# Patient Record
Sex: FEMALE | Race: WHITE | NOT HISPANIC OR LATINO | Employment: FULL TIME
[De-identification: names, ages, dates, MRNs, and addresses within clinical notes are randomized per-mention and may not be internally consistent; named-entity substitution may affect disease eponyms.]

---

## 2017-03-15 PROBLEM — M25.642 FINGER STIFFNESS, LEFT: Status: ACTIVE | Noted: 2017-03-15

## 2017-08-24 PROCEDURE — 88175 CYTOPATH C/V AUTO FLUID REDO: CPT | Performed by: INTERNAL MEDICINE

## 2017-09-11 PROBLEM — M72.0 DUPUYTREN'S CONTRACTURE OF BOTH HANDS: Status: ACTIVE | Noted: 2017-09-11

## 2017-09-14 PROCEDURE — 87086 URINE CULTURE/COLONY COUNT: CPT | Performed by: INTERNAL MEDICINE

## 2017-09-18 PROBLEM — M72.0 DUPUYTREN'S CONTRACTURE OF LEFT HAND: Status: ACTIVE | Noted: 2017-09-18

## 2017-09-19 PROBLEM — M79.645 FINGER PAIN, LEFT: Status: ACTIVE | Noted: 2017-09-19

## 2017-12-11 PROBLEM — M72.2 PLANTAR FASCIAL FIBROMATOSIS OF LEFT FOOT: Status: ACTIVE | Noted: 2017-12-11

## 2018-01-30 PROBLEM — M19.042 OSTEOARTHRITIS OF FINGER, LEFT: Status: ACTIVE | Noted: 2018-01-30

## 2018-02-13 PROBLEM — K76.0 FATTY LIVER: Status: ACTIVE | Noted: 2018-02-13

## 2018-03-28 PROCEDURE — 87493 C DIFF AMPLIFIED PROBE: CPT | Performed by: EMERGENCY MEDICINE

## 2018-03-28 PROCEDURE — 87329 GIARDIA AG IA: CPT | Performed by: EMERGENCY MEDICINE

## 2018-03-28 PROCEDURE — 87272 CRYPTOSPORIDIUM AG IF: CPT | Performed by: EMERGENCY MEDICINE

## 2018-03-28 PROCEDURE — 87046 STOOL CULTR AEROBIC BACT EA: CPT | Performed by: EMERGENCY MEDICINE

## 2018-03-28 PROCEDURE — 87177 OVA AND PARASITES SMEARS: CPT | Performed by: EMERGENCY MEDICINE

## 2018-03-28 PROCEDURE — 87045 FECES CULTURE AEROBIC BACT: CPT | Performed by: EMERGENCY MEDICINE

## 2018-03-28 PROCEDURE — 87209 SMEAR COMPLEX STAIN: CPT | Performed by: EMERGENCY MEDICINE

## 2018-04-05 PROCEDURE — 88305 TISSUE EXAM BY PATHOLOGIST: CPT | Performed by: INTERNAL MEDICINE

## 2018-06-12 PROBLEM — S92.414A: Status: ACTIVE | Noted: 2018-06-12

## 2018-08-28 PROCEDURE — 82164 ANGIOTENSIN I ENZYME TEST: CPT | Performed by: OTHER

## 2018-08-28 PROCEDURE — 83516 IMMUNOASSAY NONANTIBODY: CPT | Performed by: OTHER

## 2018-08-28 PROCEDURE — 86235 NUCLEAR ANTIGEN ANTIBODY: CPT | Performed by: OTHER

## 2018-08-28 PROCEDURE — 86255 FLUORESCENT ANTIBODY SCREEN: CPT | Performed by: OTHER

## 2018-08-28 PROCEDURE — 83519 RIA NONANTIBODY: CPT | Performed by: OTHER

## 2018-08-28 PROCEDURE — 86225 DNA ANTIBODY NATIVE: CPT | Performed by: OTHER

## 2018-08-28 PROCEDURE — 84238 ASSAY NONENDOCRINE RECEPTOR: CPT | Performed by: OTHER

## 2018-08-28 PROCEDURE — 86618 LYME DISEASE ANTIBODY: CPT | Performed by: OTHER

## 2018-09-06 PROBLEM — H02.402 PTOSIS, LEFT: Status: ACTIVE | Noted: 2018-09-06

## 2018-11-02 PROBLEM — K52.9 COLITIS: Status: ACTIVE | Noted: 2018-03-23

## 2018-11-02 PROBLEM — K75.81 NASH (NONALCOHOLIC STEATOHEPATITIS): Status: ACTIVE | Noted: 2018-02-13

## 2019-01-23 PROCEDURE — 83516 IMMUNOASSAY NONANTIBODY: CPT | Performed by: INTERNAL MEDICINE

## 2019-01-23 PROCEDURE — 86038 ANTINUCLEAR ANTIBODIES: CPT | Performed by: INTERNAL MEDICINE

## 2019-01-23 PROCEDURE — 36415 COLL VENOUS BLD VENIPUNCTURE: CPT | Performed by: INTERNAL MEDICINE

## 2019-01-23 PROCEDURE — 86235 NUCLEAR ANTIGEN ANTIBODY: CPT | Performed by: INTERNAL MEDICINE

## 2019-01-23 PROCEDURE — 86200 CCP ANTIBODY: CPT | Performed by: INTERNAL MEDICINE

## 2019-01-23 PROCEDURE — 86431 RHEUMATOID FACTOR QUANT: CPT | Performed by: INTERNAL MEDICINE

## 2019-01-23 PROCEDURE — 86225 DNA ANTIBODY NATIVE: CPT | Performed by: INTERNAL MEDICINE

## 2019-03-12 PROBLEM — M25.859 FEMOROACETABULAR IMPINGEMENT: Status: ACTIVE | Noted: 2019-03-12

## 2019-04-11 PROBLEM — M48.061 LUMBAR FORAMINAL STENOSIS: Status: ACTIVE | Noted: 2019-04-11

## 2019-04-11 PROBLEM — G89.29 CHRONIC BILATERAL LOW BACK PAIN WITH LEFT-SIDED SCIATICA: Status: ACTIVE | Noted: 2019-04-11

## 2019-04-11 PROBLEM — M54.42 CHRONIC BILATERAL LOW BACK PAIN WITH LEFT-SIDED SCIATICA: Status: ACTIVE | Noted: 2019-04-11

## 2019-05-24 PROBLEM — G56.21 ULNAR NEURITIS, RIGHT: Status: ACTIVE | Noted: 2019-05-24

## 2019-06-09 ENCOUNTER — DIAGNOSTIC TRANS (OUTPATIENT)
Dept: OTHER | Age: 54
End: 2019-06-09

## 2019-06-09 ENCOUNTER — HOSPITAL (OUTPATIENT)
Dept: OTHER | Age: 54
End: 2019-06-09

## 2019-06-09 LAB
ALBUMIN SERPL-MCNC: 3.9 G/DL (ref 3.6–5.1)
ALP SERPL-CCNC: 83 UNITS/L (ref 45–117)
ALT SERPL-CCNC: 54 UNITS/L
AMORPH SED URNS QL MICRO: ABNORMAL
ANALYZER ANC (IANC): ABNORMAL
ANION GAP SERPL CALC-SCNC: 13 MMOL/L (ref 10–20)
APPEARANCE UR: CLEAR
APTT PPP: 23 SEC (ref 22–32)
APTT PPP: NORMAL S
APTT PPP: NORMAL S
AST SERPL-CCNC: 41 UNITS/L
BACTERIA #/AREA URNS HPF: ABNORMAL /HPF
BASOPHILS # BLD: 0 K/MCL (ref 0–0.3)
BASOPHILS NFR BLD: 0 %
BILIRUB CONJ SERPL-MCNC: 0.1 MG/DL (ref 0–0.2)
BILIRUB SERPL-MCNC: 0.3 MG/DL (ref 0.2–1)
BILIRUB UR QL: NEGATIVE
BUN SERPL-MCNC: 27 MG/DL (ref 6–20)
BUN/CREAT SERPL: 33 (ref 7–25)
CALCIUM SERPL-MCNC: 9.3 MG/DL (ref 8.4–10.2)
CAOX CRY URNS QL MICRO: ABNORMAL
CHLORIDE SERPL-SCNC: 103 MMOL/L (ref 98–107)
CO2 SERPL-SCNC: 26 MMOL/L (ref 21–32)
COLOR UR: YELLOW
CREAT SERPL-MCNC: 0.82 MG/DL (ref 0.51–0.95)
D DIMER PPP FEU-MCNC: 0.23 MG/L (FEU)
D DIMER PPP FEU-MCNC: NORMAL
DIFFERENTIAL METHOD BLD: ABNORMAL
EOSINOPHIL # BLD: 0 K/MCL (ref 0.1–0.5)
EOSINOPHIL NFR BLD: 0 %
EPITH CASTS #/AREA URNS LPF: ABNORMAL /[LPF]
ERYTHROCYTE [DISTWIDTH] IN BLOOD: 12.2 % (ref 11–15)
FATTY CASTS #/AREA URNS LPF: ABNORMAL /[LPF]
GLUCOSE SERPL-MCNC: 98 MG/DL (ref 65–99)
GLUCOSE UR-MCNC: NEGATIVE MG/DL
GRAN CASTS #/AREA URNS LPF: ABNORMAL /[LPF]
HCT VFR BLD CALC: 42.6 % (ref 36–46.5)
HGB BLD-MCNC: 14.5 G/DL (ref 12–15.5)
HGB UR QL: NEGATIVE
HYALINE CASTS #/AREA URNS LPF: ABNORMAL /LPF (ref 0–5)
IMM GRANULOCYTES # BLD AUTO: 0.1 K/MCL (ref 0–0.2)
IMM GRANULOCYTES NFR BLD: 1 %
INR PPP: 1
INR PPP: NORMAL
KETONES UR-MCNC: ABNORMAL MG/DL
LEUKOCYTE ESTERASE UR QL STRIP: NEGATIVE
LIPASE SERPL-CCNC: 281 UNITS/L (ref 73–393)
LYMPHOCYTES # BLD: 1.5 K/MCL (ref 1–4)
LYMPHOCYTES NFR BLD: 16 %
MCH RBC QN AUTO: 34.9 PG (ref 26–34)
MCHC RBC AUTO-ENTMCNC: 34 G/DL (ref 32–36.5)
MCV RBC AUTO: 102.4 FL (ref 78–100)
MIXED CELL CASTS #/AREA URNS LPF: ABNORMAL /[LPF]
MONOCYTES # BLD: 0.8 K/MCL (ref 0.3–0.9)
MONOCYTES NFR BLD: 9 %
MUCOUS THREADS URNS QL MICRO: ABNORMAL
NEUTROPHILS # BLD: 6.7 K/MCL (ref 1.8–7.7)
NEUTROPHILS NFR BLD: 74 %
NEUTS SEG NFR BLD: ABNORMAL %
NITRITE UR QL: NEGATIVE
NRBC (NRBCRE): 0 /100 WBC
PH UR: 6 UNITS (ref 5–7)
PLATELET # BLD: 220 K/MCL (ref 140–450)
POTASSIUM SERPL-SCNC: 3.9 MMOL/L (ref 3.4–5.1)
PROT SERPL-MCNC: 7.5 G/DL (ref 6.4–8.2)
PROT UR QL: NEGATIVE MG/DL
PROTHROMBIN TIME (PRT2): NORMAL
PROTHROMBIN TIME: 10.2 SEC (ref 9.7–11.8)
RBC # BLD: 4.16 MIL/MCL (ref 4–5.2)
RBC #/AREA URNS HPF: ABNORMAL /HPF (ref 0–2)
RBC CASTS #/AREA URNS LPF: ABNORMAL /[LPF]
RENAL EPI CELLS #/AREA URNS HPF: ABNORMAL /[HPF]
SODIUM SERPL-SCNC: 138 MMOL/L (ref 135–145)
SP GR UR: 1.02 (ref 1–1.03)
SPECIMEN SOURCE: ABNORMAL
SPERM URNS QL MICRO: ABNORMAL
SQUAMOUS #/AREA URNS HPF: ABNORMAL /HPF (ref 0–5)
T VAGINALIS URNS QL MICRO: ABNORMAL
TRI-PHOS CRY URNS QL MICRO: ABNORMAL
TROPONIN I SERPL HS-MCNC: <0.02 NG/ML
URATE CRY URNS QL MICRO: ABNORMAL
URINE REFLEX: ABNORMAL
URNS CMNT MICRO: ABNORMAL
UROBILINOGEN UR QL: 0.2 MG/DL (ref 0–1)
WAXY CASTS #/AREA URNS LPF: ABNORMAL /[LPF]
WBC # BLD: 9.2 K/MCL (ref 4.2–11)
WBC #/AREA URNS HPF: ABNORMAL /HPF (ref 0–5)
WBC CASTS #/AREA URNS LPF: ABNORMAL /[LPF]
YEAST HYPHAE URNS QL MICRO: ABNORMAL
YEAST URNS QL MICRO: ABNORMAL

## 2019-06-09 PROCEDURE — 99245 OFF/OP CONSLTJ NEW/EST HI 55: CPT | Performed by: SURGERY

## 2019-07-30 PROCEDURE — 88305 TISSUE EXAM BY PATHOLOGIST: CPT | Performed by: OTOLARYNGOLOGY

## 2019-08-08 PROBLEM — E78.00 HYPERCHOLESTEROLEMIA: Status: ACTIVE | Noted: 2019-08-08

## 2019-08-08 PROBLEM — F41.9 ANXIETY: Status: ACTIVE | Noted: 2019-08-08

## 2019-08-08 PROBLEM — G47.30 SLEEP APNEA IN ADULT: Status: ACTIVE | Noted: 2019-08-08

## 2019-08-08 PROBLEM — I63.81 CEREBROVASCULAR ACCIDENT (CVA) OF LEFT THALAMUS (HCC): Status: ACTIVE | Noted: 2019-08-08

## 2019-08-08 PROBLEM — I63.9 CEREBROVASCULAR ACCIDENT (CVA) OF LEFT THALAMUS (HCC): Status: ACTIVE | Noted: 2019-08-08

## 2019-08-08 PROBLEM — L92.0 GRANULOMA ANNULARE: Status: ACTIVE | Noted: 2019-08-08

## 2019-08-08 PROBLEM — K83.8 BILIARY SLUDGE: Status: ACTIVE | Noted: 2019-08-08

## 2019-08-08 PROBLEM — K25.3 ANTRAL ULCER, ACUTE: Status: ACTIVE | Noted: 2019-08-08

## 2019-08-08 PROBLEM — R76.8 POSITIVE ANA (ANTINUCLEAR ANTIBODY): Status: ACTIVE | Noted: 2019-08-08

## 2020-02-23 ENCOUNTER — HOSPITAL ENCOUNTER (EMERGENCY)
Age: 55
Discharge: HOME | End: 2020-02-23
Attending: EMERGENCY MEDICINE

## 2020-02-23 VITALS
OXYGEN SATURATION: 96 % | BODY MASS INDEX: 32.81 KG/M2 | TEMPERATURE: 98.2 F | WEIGHT: 167.11 LBS | RESPIRATION RATE: 16 BRPM | HEIGHT: 60 IN | HEART RATE: 94 BPM

## 2020-02-23 DIAGNOSIS — R00.2 PALPITATIONS: Primary | ICD-10-CM

## 2020-02-23 LAB
ALBUMIN SERPL-MCNC: 3.6 G/DL (ref 3.6–5.1)
ALBUMIN/GLOB SERPL: 1 {RATIO} (ref 1–2.4)
ALP SERPL-CCNC: 98 UNITS/L (ref 45–117)
ALT SERPL-CCNC: 45 UNITS/L
ANION GAP SERPL CALC-SCNC: 16 MMOL/L (ref 10–20)
AST SERPL-CCNC: 37 UNITS/L
BASOPHILS # BLD AUTO: 0 K/MCL (ref 0–0.3)
BASOPHILS NFR BLD AUTO: 0 %
BILIRUB SERPL-MCNC: 0.2 MG/DL (ref 0.2–1)
BUN SERPL-MCNC: 17 MG/DL (ref 6–20)
BUN/CREAT SERPL: 25 (ref 7–25)
CALCIUM SERPL-MCNC: 9.6 MG/DL (ref 8.4–10.2)
CHLORIDE SERPL-SCNC: 106 MMOL/L (ref 98–107)
CO2 SERPL-SCNC: 22 MMOL/L (ref 21–32)
CREAT SERPL-MCNC: 0.68 MG/DL (ref 0.51–0.95)
DIFFERENTIAL METHOD BLD: ABNORMAL
EOSINOPHIL # BLD AUTO: 0 K/MCL (ref 0.1–0.5)
EOSINOPHIL NFR SPEC: 0 %
ERYTHROCYTE [DISTWIDTH] IN BLOOD: 12.7 % (ref 11–15)
GLOBULIN SER-MCNC: 3.5 G/DL (ref 2–4)
GLUCOSE SERPL-MCNC: 107 MG/DL (ref 65–99)
HCT VFR BLD CALC: 37.3 % (ref 36–46.5)
HGB BLD-MCNC: 12.2 G/DL (ref 12–15.5)
IMM GRANULOCYTES # BLD AUTO: 0.1 K/MCL (ref 0–0.2)
IMM GRANULOCYTES NFR BLD: 1 %
LYMPHOCYTES # BLD MANUAL: 1.8 K/MCL (ref 1–4)
LYMPHOCYTES NFR BLD MANUAL: 19 %
MAGNESIUM SERPL-MCNC: 2.1 MG/DL (ref 1.7–2.4)
MCH RBC QN AUTO: 33.7 PG (ref 26–34)
MCHC RBC AUTO-ENTMCNC: 32.7 G/DL (ref 32–36.5)
MCV RBC AUTO: 103 FL (ref 78–100)
MONOCYTES # BLD MANUAL: 0.6 K/MCL (ref 0.3–0.9)
MONOCYTES NFR BLD MANUAL: 6 %
NEUTROPHILS # BLD: 7.1 K/MCL (ref 1.8–7.7)
NEUTROPHILS NFR BLD AUTO: 74 %
NRBC BLD MANUAL-RTO: 0 /100 WBC
PLATELET # BLD: 181 K/MCL (ref 140–450)
POTASSIUM SERPL-SCNC: 3.5 MMOL/L (ref 3.4–5.1)
PROT SERPL-MCNC: 7.1 G/DL (ref 6.4–8.2)
RBC # BLD: 3.62 MIL/MCL (ref 4–5.2)
SODIUM SERPL-SCNC: 140 MMOL/L (ref 135–145)
WBC # BLD: 9.6 K/MCL (ref 4.2–11)

## 2020-02-23 PROCEDURE — 80053 COMPREHEN METABOLIC PANEL: CPT

## 2020-02-23 PROCEDURE — 96374 THER/PROPH/DIAG INJ IV PUSH: CPT

## 2020-02-23 PROCEDURE — 85025 COMPLETE CBC W/AUTO DIFF WBC: CPT

## 2020-02-23 PROCEDURE — 83735 ASSAY OF MAGNESIUM: CPT

## 2020-02-23 PROCEDURE — 10002807 HB RX 258: Performed by: EMERGENCY MEDICINE

## 2020-02-23 PROCEDURE — 99283 EMERGENCY DEPT VISIT LOW MDM: CPT

## 2020-02-23 PROCEDURE — 10002800 HB RX 250 W HCPCS: Performed by: EMERGENCY MEDICINE

## 2020-02-23 RX ORDER — ALPRAZOLAM 0.5 MG/1
TABLET ORAL
COMMUNITY
Start: 2019-08-09

## 2020-02-23 RX ORDER — AMITRIPTYLINE HYDROCHLORIDE 10 MG/1
TABLET, FILM COATED ORAL
COMMUNITY
Start: 2020-01-20

## 2020-02-23 RX ORDER — SIMVASTATIN 20 MG
TABLET ORAL
COMMUNITY
Start: 2019-08-08

## 2020-02-23 RX ORDER — CHOLECALCIFEROL (VITAMIN D3) 125 MCG
100 CAPSULE ORAL
COMMUNITY
Start: 2019-08-08

## 2020-02-23 RX ORDER — METOPROLOL SUCCINATE 100 MG/1
TABLET, EXTENDED RELEASE ORAL
COMMUNITY
Start: 2019-08-08

## 2020-02-23 RX ORDER — BUTALBITAL, ACETAMINOPHEN AND CAFFEINE 300; 40; 50 MG/1; MG/1; MG/1
1 CAPSULE ORAL EVERY 6 HOURS PRN
Qty: 6 CAPSULE | Refills: 0 | Status: SHIPPED | OUTPATIENT
Start: 2020-02-23

## 2020-02-23 RX ORDER — METHYLPREDNISOLONE 4 MG/1
TABLET ORAL
COMMUNITY
Start: 2020-02-21 | End: 2020-05-21 | Stop reason: CLARIF

## 2020-02-23 RX ORDER — DULOXETIN HYDROCHLORIDE 30 MG/1
CAPSULE, DELAYED RELEASE ORAL
COMMUNITY
Start: 2018-11-08

## 2020-02-23 RX ORDER — CITALOPRAM 20 MG/1
20 TABLET ORAL
COMMUNITY

## 2020-02-23 RX ADMIN — SODIUM CHLORIDE 1000 ML: 0.9 INJECTION, SOLUTION INTRAVENOUS at 15:14

## 2020-02-23 RX ADMIN — KETOROLAC TROMETHAMINE 15 MG: 30 INJECTION, SOLUTION INTRAMUSCULAR at 15:27

## 2020-02-23 ASSESSMENT — ENCOUNTER SYMPTOMS
SHORTNESS OF BREATH: 0
LIGHT-HEADEDNESS: 0
CHEST TIGHTNESS: 0
CHILLS: 0
FATIGUE: 0
DIZZINESS: 0
VOMITING: 0
EYE PAIN: 0
BACK PAIN: 0
ABDOMINAL PAIN: 0
RHINORRHEA: 0
FEVER: 0
NAUSEA: 0

## 2020-02-23 ASSESSMENT — PAIN SCALES - GENERAL: PAINLEVEL_OUTOF10: 7

## 2020-03-09 ENCOUNTER — OFFICE VISIT (OUTPATIENT)
Dept: RHEUMATOLOGY | Facility: CLINIC | Age: 55
End: 2020-03-09
Payer: COMMERCIAL

## 2020-03-09 ENCOUNTER — APPOINTMENT (OUTPATIENT)
Dept: LAB | Age: 55
End: 2020-03-09
Payer: COMMERCIAL

## 2020-03-09 VITALS
HEART RATE: 94 BPM | BODY MASS INDEX: 32.2 KG/M2 | WEIGHT: 164 LBS | HEIGHT: 60 IN | SYSTOLIC BLOOD PRESSURE: 112 MMHG | DIASTOLIC BLOOD PRESSURE: 70 MMHG

## 2020-03-09 DIAGNOSIS — R60.9 SUBMANDIBULAR GLAND SWELLING: ICD-10-CM

## 2020-03-09 DIAGNOSIS — R60.9 SUBMANDIBULAR GLAND SWELLING: Primary | ICD-10-CM

## 2020-03-09 PROCEDURE — 82787 IGG 1 2 3 OR 4 EACH: CPT

## 2020-03-09 PROCEDURE — 83876 ASSAY MYELOPEROXIDASE: CPT

## 2020-03-09 PROCEDURE — 99244 OFF/OP CNSLTJ NEW/EST MOD 40: CPT | Performed by: INTERNAL MEDICINE

## 2020-03-09 PROCEDURE — 86255 FLUORESCENT ANTIBODY SCREEN: CPT

## 2020-03-09 PROCEDURE — 82164 ANGIOTENSIN I ENZYME TEST: CPT

## 2020-03-09 PROCEDURE — 36415 COLL VENOUS BLD VENIPUNCTURE: CPT

## 2020-03-09 PROCEDURE — 83516 IMMUNOASSAY NONANTIBODY: CPT

## 2020-03-09 NOTE — PROGRESS NOTES
Georgia Francois is a 62year old female who presents for Patient presents with:  Consult  Abnormal Labs  Rash  Swelling  .    HPI:   CC: neck swelling   Consult: referred by PCP Dr. Catherine Parker    This is a 61 yo F with hx of HTN, HLD, CVA April 2019 (R faci needed.  30 tablet 2   • METOPROLOL SUCCINATE  MG Oral Tablet 24 Hr Take 1 tablet by mouth daily 90 tablet 3   • Pantoprazole Sodium 40 MG Oral Tab EC Take 40 mg by mouth every morning before breakfast.     • simvastatin 20 MG Oral Tab Take 1 tablet ( Ulnar neuropathy at elbow, right     EMG 11/16      Past Surgical History:   Procedure Laterality Date   • COLONOSCOPY  2012, 4/18   • EGD  2012    normal dd bx   • IMPLANT LEFT  2004   • IMPLANT RIGHT  2004   • LUMBAR / TRANSFORAMINAL EPIDURAL STEROID INJ hx of anxiety or depression  ENDO: no hx of thyroid disease, no hx of DM  Joint/Muscluskeltal: see HPI,   All other ROS are negative.      EXAM:   LMP  (LMP Unknown)   GEN: AAOx3, NAD  HEENT: EOMI, PERRLA, no injection or icterus, oral mucosa moist,  Neck s Antibody, IgG      0 - 40 AU/mL 11   SSA 60 (Ro) (SAMEER) Ab, IgG      0 - 40 AU/mL 0   SSB (La) (SAMEER) Antibody, IgG      0 - 40 AU/mL 0   Ardon (SAMEER) Antibody, IgG      0 - 40 AU/mL 0   Ribonucleic Protein (U1) (SAMEER) Ab, IgG      0 - 40 AU/mL 0     Component and concentric disc bulging, without lumbar disc extrusion,  specific nerve root compression, or central canal stenosis.   2.  Mild-moderate multilevel lumbar facet arthrosis, contributing to mild-moderate biforaminal  stenosis at L5-S1, without subfacetal

## 2020-03-09 NOTE — PATIENT INSTRUCTIONS
You were seen today for swelling in your neck  Sjogren, IgG 4 related disease is on the differential but your blood work is negative for sjogrens  Lets get some more blood work  In the mean time see endocrinology  May need to do a biopsy of your gland depe

## 2020-03-10 ENCOUNTER — TELEPHONE (OUTPATIENT)
Dept: ENDOCRINOLOGY CLINIC | Facility: CLINIC | Age: 55
End: 2020-03-10

## 2020-03-10 NOTE — TELEPHONE ENCOUNTER
Pt states that her Rheumatologist dr Denice Allison is requesting for the pt to be seen sooner then 6/22/20 for consult, due to lump in neck, which is making it difficult for pt to breath well.

## 2020-03-10 NOTE — TELEPHONE ENCOUNTER
Patient was contacted and offered res 24 spot for this Friday. She is now scheduled as below. Address to [facility] OF THE [facility] provided.     Future Appointments   Date Time Provider Olaf Reilly   3/13/2020 10:30 AM Mckenna Swain MD [address] [place] [address] OF THE [facility]

## 2020-03-11 LAB
ANGIOTENSIN CONVERTING ENZYME: 26 U/L
MYELOPEROX ANTIBODIES, IGG: 0 AU/ML
SERINE PROTEASE3, IGG: 0 AU/ML

## 2020-03-12 LAB
IMMUNOGLOBULIN G SUBCLASS 1: 406 MG/DL
IMMUNOGLOBULIN G SUBCLASS 2: 92 MG/DL
IMMUNOGLOBULIN G SUBCLASS 3: 17 MG/DL
IMMUNOGLOBULIN G SUBCLASS 4: 7 MG/DL

## 2020-03-13 ENCOUNTER — OFFICE VISIT (OUTPATIENT)
Dept: ENDOCRINOLOGY CLINIC | Facility: CLINIC | Age: 55
End: 2020-03-13
Payer: COMMERCIAL

## 2020-03-13 VITALS
DIASTOLIC BLOOD PRESSURE: 70 MMHG | WEIGHT: 169 LBS | SYSTOLIC BLOOD PRESSURE: 113 MMHG | HEART RATE: 93 BPM | BODY MASS INDEX: 33 KG/M2

## 2020-03-13 DIAGNOSIS — E04.1 THYROID NODULE: Primary | ICD-10-CM

## 2020-03-13 DIAGNOSIS — R63.5 WEIGHT GAIN: ICD-10-CM

## 2020-03-13 PROCEDURE — 99243 OFF/OP CNSLTJ NEW/EST LOW 30: CPT | Performed by: INTERNAL MEDICINE

## 2020-03-13 NOTE — PROGRESS NOTES
Name: Nikolay Lauren  Date: 3/13/2020    Referring Physician: No ref. provider found    Patient presents with:  Consult: Pt was Advised by Dr. Marianela Laurent to f/u with Endocrinologist due to swelling on neck and possible cushings.  Pt reports it is difficult for Current Outpatient Medications:   •  ALPRAZolam (XANAX) 0.5 MG Oral Tab, Take 1 tablet (0.5 mg total) by mouth daily as needed. , Disp: 30 tablet, Rfl: 2  •  METOPROLOL SUCCINATE  MG Oral Tablet 24 Hr, Take 1 tablet by mouth daily, Disp: 90 tablet week        Comment: 2-3 per week      Drug use: No    Social History Narrative      . Has a boyfriend who resides in [facility]). 2 children. Works in [employer].       Medical History:   Past Medical History:   Diagnosis Date   • Antr Alert, in no acute distress, well developed  Eyes: normal conjunctivae, sclera.   Ears/Nose/Mouth/Throat/Neck:  normal hearing, normal speech   Neurologic: sensory grossly intact and motor grossly intact  Respiratory:  clear to auscultation bilaterally  Ca

## 2020-03-15 ENCOUNTER — APPOINTMENT (OUTPATIENT)
Dept: LAB | Facility: HOSPITAL | Age: 55
End: 2020-03-15
Attending: INTERNAL MEDICINE
Payer: COMMERCIAL

## 2020-03-15 PROCEDURE — 82533 TOTAL CORTISOL: CPT

## 2020-03-16 ENCOUNTER — APPOINTMENT (OUTPATIENT)
Dept: LAB | Facility: HOSPITAL | Age: 55
End: 2020-03-16
Attending: INTERNAL MEDICINE
Payer: COMMERCIAL

## 2020-03-16 ENCOUNTER — HOSPITAL ENCOUNTER (OUTPATIENT)
Dept: ULTRASOUND IMAGING | Age: 55
Discharge: HOME | End: 2020-03-16
Attending: INTERNAL MEDICINE
Payer: COMMERCIAL

## 2020-03-16 ENCOUNTER — OFFICE VISIT (OUTPATIENT)
Dept: OTOLARYNGOLOGY | Facility: CLINIC | Age: 55
End: 2020-03-16
Payer: COMMERCIAL

## 2020-03-16 VITALS
WEIGHT: 169 LBS | BODY MASS INDEX: 33.18 KG/M2 | SYSTOLIC BLOOD PRESSURE: 110 MMHG | DIASTOLIC BLOOD PRESSURE: 60 MMHG | TEMPERATURE: 98 F | HEIGHT: 60 IN

## 2020-03-16 DIAGNOSIS — R63.5 WEIGHT GAIN: ICD-10-CM

## 2020-03-16 DIAGNOSIS — R22.1 NECK SWELLING: Primary | ICD-10-CM

## 2020-03-16 DIAGNOSIS — E04.1 THYROID NODULE: ICD-10-CM

## 2020-03-16 LAB — CORTIS SERPL-MCNC: 9 UG/DL

## 2020-03-16 PROCEDURE — 82533 TOTAL CORTISOL: CPT

## 2020-03-16 PROCEDURE — 82024 ASSAY OF ACTH: CPT

## 2020-03-16 PROCEDURE — 36415 COLL VENOUS BLD VENIPUNCTURE: CPT

## 2020-03-16 PROCEDURE — 76536 US EXAM OF HEAD AND NECK: CPT | Performed by: INTERNAL MEDICINE

## 2020-03-16 PROCEDURE — 99243 OFF/OP CNSLTJ NEW/EST LOW 30: CPT | Performed by: OTOLARYNGOLOGY

## 2020-03-17 LAB — ADRENOCORTICOTROPIC HORMONE: 10.1 PG/ML

## 2020-03-17 NOTE — PROGRESS NOTES
Georgia Francois is a 62year old female. Patient presents with:  Swelling: pt presents with neck swelling for 1 month, pain when swallowing     HPI:   She has had a lot of pain in her neck and swelling in her neck as well over the last few months.   She i left thalamus ([facility] [address] 8/8/2019   • Cervicalgia 12/1/2015   • Essential hypertension    • Hepatic steatosis 5/27/2014   • History of non anemic vitamin B12 deficiency 4/24/2014   • History of panic attacks    • Hypertension 4/24/2014   • IBS (irritable bowel Normal. Eyebrows - Normal. Skull - Normal.   Oral/Oropharynx Normal Lips - Normal, Tonsils - Normal, Tongue - Normal    Nasal Normal External nose - Normal. Nasal septum - Normal, Turbinates - Normal   Neurological Normal Memory - Normal. Cranial nerves -

## 2020-03-18 LAB — CORTISOL, SALIVA: 0.01 UG/DL

## 2020-03-19 ENCOUNTER — PATIENT MESSAGE (OUTPATIENT)
Dept: ENDOCRINOLOGY CLINIC | Facility: CLINIC | Age: 55
End: 2020-03-19

## 2020-03-19 DIAGNOSIS — E04.1 THYROID NODULE: Primary | ICD-10-CM

## 2020-03-19 RX ORDER — DEXAMETHASONE 1 MG
TABLET ORAL
Qty: 1 TABLET | Refills: 0 | Status: SHIPPED | OUTPATIENT
Start: 2020-03-19 | End: 2020-08-10

## 2020-03-19 NOTE — TELEPHONE ENCOUNTER
Called patient , gave her message by dr Scotty Barragan. Patient verbalized understanding and  agrees with plan. Medication sent to [facility] and labs ordered.

## 2020-03-19 NOTE — TELEPHONE ENCOUNTER
From: Maryellen Merino  To: Gary Doty MD  Sent: 3/19/2020 10:47 AM CDT  Subject: Test Results Question    Hi Dr. Michelle Fernandez,    I just received the results from my saliva cortisol test that I completed at 11:30pm on Sunday evening.  Based on my number lookin

## 2020-03-19 NOTE — TELEPHONE ENCOUNTER
The results are fairly normal so I do think the risk of Cushings at this point is low.   However lets confirm with one more test - low dose dexamethasone suppression test.  Take Dexamethasone 1mg PO QHS at 11pm and check cortisol, ACTH at 8AM the next morni

## 2020-03-21 ENCOUNTER — APPOINTMENT (OUTPATIENT)
Dept: LAB | Facility: HOSPITAL | Age: 55
End: 2020-03-21
Attending: INTERNAL MEDICINE
Payer: COMMERCIAL

## 2020-03-21 DIAGNOSIS — E04.1 THYROID NODULE: ICD-10-CM

## 2020-03-21 LAB — CORTIS SERPL-MCNC: 1 UG/DL

## 2020-03-21 PROCEDURE — 36415 COLL VENOUS BLD VENIPUNCTURE: CPT

## 2020-03-21 PROCEDURE — 82533 TOTAL CORTISOL: CPT

## 2020-03-21 PROCEDURE — 82024 ASSAY OF ACTH: CPT

## 2020-03-22 LAB — ADRENOCORTICOTROPIC HORMONE: <1 PG/ML

## 2020-03-23 ENCOUNTER — PATIENT MESSAGE (OUTPATIENT)
Dept: OTOLARYNGOLOGY | Facility: CLINIC | Age: 55
End: 2020-03-23

## 2020-03-23 DIAGNOSIS — M26.69 TMJ INFLAMMATION: Primary | ICD-10-CM

## 2020-03-23 NOTE — TELEPHONE ENCOUNTER
From: Russel Schneider  To: Alden Cranker. Norma Becker MD  Sent: 3/23/2020 3:15 PM CDT  Subject: Visit Srinivas Becker,    Since I last saw you, I had an MRI of my neck, 3/20/20, which reported essentially nothing.      The CT scan I had on 2/6/20

## 2020-03-24 ENCOUNTER — PATIENT MESSAGE (OUTPATIENT)
Dept: ENDOCRINOLOGY CLINIC | Facility: CLINIC | Age: 55
End: 2020-03-24

## 2020-03-24 NOTE — TELEPHONE ENCOUNTER
I discussed the findings with her today and I recommended that we treat her with physical therapy and a mouthguard for TMJ. I also discussed the situation with Dr. Ashley Azar today.

## 2020-03-24 NOTE — TELEPHONE ENCOUNTER
Dr. Mauricio Escobar please see the patient's message below. Labs results she mentions are available in care everywhere. I did tell the patient to call her PCP to report her abdominal symptoms and advised when to go to the ER.      She is inquiring further regardi

## 2020-03-24 NOTE — TELEPHONE ENCOUNTER
From: Ramonita Montoya  To: Sydnee Eddy MD  Sent: 3/24/2020 2:55 PM CDT  Subject: Visit Jacqueline Muñoz – Thank you for the Cushing’s tests. Should we be concerned that most of those tests were at low level?  I’m still having an enlarge

## 2020-03-25 NOTE — TELEPHONE ENCOUNTER
Can you schedule time for me to chat with her tomorrow or Friday about some options - telehealth. Thanks.

## 2020-03-27 ENCOUNTER — TELEPHONE (OUTPATIENT)
Dept: ENDOCRINOLOGY CLINIC | Facility: CLINIC | Age: 55
End: 2020-03-27

## 2020-03-27 DIAGNOSIS — E66.9 OBESITY (BMI 35.0-39.9 WITHOUT COMORBIDITY): Primary | ICD-10-CM

## 2020-03-27 PROCEDURE — 99212 OFFICE O/P EST SF 10 MIN: CPT | Performed by: INTERNAL MEDICINE

## 2020-03-27 RX ORDER — PHENTERMINE HYDROCHLORIDE 15 MG/1
15 CAPSULE ORAL EVERY MORNING
Qty: 30 CAPSULE | Refills: 1 | Status: SHIPPED | OUTPATIENT
Start: 2020-03-27 | End: 2020-08-10

## 2020-03-27 NOTE — TELEPHONE ENCOUNTER
Virtual/Telephone Check-In    Georgia Francois verbally consents to a Virtual/Telephone Check-In service on 03/27/20. Patient understands and accepts financial responsibility for any deductible, co-insurance and/or co-pays associated with this service.

## 2020-05-08 ENCOUNTER — HOSPITAL ENCOUNTER (OUTPATIENT)
Dept: LAB | Age: 55
Discharge: HOME | End: 2020-05-08
Attending: INTERNAL MEDICINE

## 2020-05-08 ENCOUNTER — HOSPITAL ENCOUNTER (OUTPATIENT)
Dept: ULTRASOUND IMAGING | Age: 55
Discharge: HOME | End: 2020-05-08
Attending: INTERNAL MEDICINE

## 2020-05-08 DIAGNOSIS — K58.9 IRRITABLE BOWEL SYNDROME, UNSPECIFIED TYPE: Primary | ICD-10-CM

## 2020-05-08 DIAGNOSIS — R10.13 EPIGASTRIC DISCOMFORT: ICD-10-CM

## 2020-05-08 DIAGNOSIS — K58.0 IRRITABLE BOWEL SYNDROME WITH DIARRHEA: ICD-10-CM

## 2020-05-08 LAB
ALBUMIN SERPL-MCNC: 3.8 G/DL (ref 3.6–5.1)
ALP SERPL-CCNC: 101 UNITS/L (ref 45–117)
ALT SERPL-CCNC: 34 UNITS/L
AST SERPL-CCNC: 31 UNITS/L
BILIRUB CONJ SERPL-MCNC: 0.1 MG/DL (ref 0–0.2)
BILIRUB SERPL-MCNC: 0.3 MG/DL (ref 0.2–1)
BUN SERPL-MCNC: 18 MG/DL (ref 6–20)
BUN/CREAT SERPL: 30 (ref 7–25)
CREAT SERPL-MCNC: 0.59 MG/DL (ref 0.51–0.95)
ERYTHROCYTE [DISTWIDTH] IN BLOOD: 12.8 % (ref 11–15)
HCT VFR BLD CALC: 41.4 % (ref 36–46.5)
HGB BLD-MCNC: 13.3 G/DL (ref 12–15.5)
LIPASE SERPL-CCNC: 111 UNITS/L (ref 73–393)
MCH RBC QN AUTO: 33.4 PG (ref 26–34)
MCHC RBC AUTO-ENTMCNC: 32.1 G/DL (ref 32–36.5)
MCV RBC AUTO: 104 FL (ref 78–100)
NRBC BLD MANUAL-RTO: 0 /100 WBC
PLATELET # BLD: 185 K/MCL (ref 140–450)
PROT SERPL-MCNC: 7.4 G/DL (ref 6.4–8.2)
RBC # BLD: 3.98 MIL/MCL (ref 4–5.2)
WBC # BLD: 5.6 K/MCL (ref 4.2–11)

## 2020-05-08 PROCEDURE — 84520 ASSAY OF UREA NITROGEN: CPT

## 2020-05-08 PROCEDURE — 83690 ASSAY OF LIPASE: CPT

## 2020-05-08 PROCEDURE — 85027 COMPLETE CBC AUTOMATED: CPT

## 2020-05-08 PROCEDURE — 80076 HEPATIC FUNCTION PANEL: CPT

## 2020-05-08 PROCEDURE — 36415 COLL VENOUS BLD VENIPUNCTURE: CPT

## 2020-05-08 PROCEDURE — 76700 US EXAM ABDOM COMPLETE: CPT

## 2020-05-21 ENCOUNTER — APPOINTMENT (OUTPATIENT)
Dept: ULTRASOUND IMAGING | Age: 55
End: 2020-05-21
Attending: EMERGENCY MEDICINE

## 2020-05-21 ENCOUNTER — HOSPITAL ENCOUNTER (EMERGENCY)
Age: 55
Discharge: HOME | End: 2020-05-21
Attending: EMERGENCY MEDICINE

## 2020-05-21 ENCOUNTER — APPOINTMENT (OUTPATIENT)
Dept: CT IMAGING | Age: 55
End: 2020-05-21
Attending: EMERGENCY MEDICINE

## 2020-05-21 VITALS
BODY MASS INDEX: 33.24 KG/M2 | DIASTOLIC BLOOD PRESSURE: 72 MMHG | SYSTOLIC BLOOD PRESSURE: 126 MMHG | RESPIRATION RATE: 16 BRPM | WEIGHT: 169.31 LBS | HEART RATE: 70 BPM | TEMPERATURE: 98.2 F | OXYGEN SATURATION: 96 % | HEIGHT: 60 IN

## 2020-05-21 DIAGNOSIS — K82.8 GALLBLADDER SLUDGE: Primary | ICD-10-CM

## 2020-05-21 LAB
ALBUMIN SERPL-MCNC: 4.1 G/DL (ref 3.6–5.1)
ALBUMIN/GLOB SERPL: 1.2 {RATIO} (ref 1–2.4)
ALP SERPL-CCNC: 101 UNITS/L (ref 45–117)
ALT SERPL-CCNC: 66 UNITS/L
ANION GAP SERPL CALC-SCNC: 12 MMOL/L (ref 10–20)
AST SERPL-CCNC: 57 UNITS/L
BASOPHILS # BLD: 0 K/MCL (ref 0–0.3)
BASOPHILS NFR BLD: 0 %
BILIRUB SERPL-MCNC: 0.3 MG/DL (ref 0.2–1)
BUN SERPL-MCNC: 18 MG/DL (ref 6–20)
BUN/CREAT SERPL: 25 (ref 7–25)
CALCIUM SERPL-MCNC: 9.5 MG/DL (ref 8.4–10.2)
CHLORIDE SERPL-SCNC: 102 MMOL/L (ref 98–107)
CO2 SERPL-SCNC: 27 MMOL/L (ref 21–32)
CREAT SERPL-MCNC: 0.71 MG/DL (ref 0.51–0.95)
DIFFERENTIAL METHOD BLD: ABNORMAL
EOSINOPHIL # BLD: 0.1 K/MCL (ref 0.1–0.5)
EOSINOPHIL NFR BLD: 1 %
ERYTHROCYTE [DISTWIDTH] IN BLOOD: 13.1 % (ref 11–15)
GLOBULIN SER-MCNC: 3.5 G/DL (ref 2–4)
GLUCOSE SERPL-MCNC: 90 MG/DL (ref 65–99)
HCT VFR BLD CALC: 39.7 % (ref 36–46.5)
HGB BLD-MCNC: 12.8 G/DL (ref 12–15.5)
IMM GRANULOCYTES # BLD AUTO: 0.1 K/MCL (ref 0–0.2)
IMM GRANULOCYTES NFR BLD: 1 %
LIPASE SERPL-CCNC: 137 UNITS/L (ref 73–393)
LYMPHOCYTES # BLD: 2.9 K/MCL (ref 1–4)
LYMPHOCYTES NFR BLD: 32 %
MCH RBC QN AUTO: 33.5 PG (ref 26–34)
MCHC RBC AUTO-ENTMCNC: 32.2 G/DL (ref 32–36.5)
MCV RBC AUTO: 103.9 FL (ref 78–100)
MONOCYTES # BLD: 0.7 K/MCL (ref 0.3–0.9)
MONOCYTES NFR BLD: 8 %
NEUTROPHILS # BLD: 5.3 K/MCL (ref 1.8–7.7)
NEUTROPHILS NFR BLD: 58 %
NRBC BLD MANUAL-RTO: 0 /100 WBC
PLATELET # BLD: 160 K/MCL (ref 140–450)
POTASSIUM SERPL-SCNC: 3.9 MMOL/L (ref 3.4–5.1)
PROT SERPL-MCNC: 7.6 G/DL (ref 6.4–8.2)
RBC # BLD: 3.82 MIL/MCL (ref 4–5.2)
SODIUM SERPL-SCNC: 137 MMOL/L (ref 135–145)
WBC # BLD: 9 K/MCL (ref 4.2–11)

## 2020-05-21 PROCEDURE — 74177 CT ABD & PELVIS W/CONTRAST: CPT

## 2020-05-21 PROCEDURE — 96374 THER/PROPH/DIAG INJ IV PUSH: CPT

## 2020-05-21 PROCEDURE — 10002800 HB RX 250 W HCPCS: Performed by: EMERGENCY MEDICINE

## 2020-05-21 PROCEDURE — 99284 EMERGENCY DEPT VISIT MOD MDM: CPT

## 2020-05-21 PROCEDURE — 83690 ASSAY OF LIPASE: CPT

## 2020-05-21 PROCEDURE — 85025 COMPLETE CBC W/AUTO DIFF WBC: CPT

## 2020-05-21 PROCEDURE — 10002805 HB CONTRAST AGENT: Performed by: EMERGENCY MEDICINE

## 2020-05-21 PROCEDURE — 80053 COMPREHEN METABOLIC PANEL: CPT

## 2020-05-21 PROCEDURE — 10002801 HB RX 250 W/O HCPCS: Performed by: EMERGENCY MEDICINE

## 2020-05-21 PROCEDURE — 70491 CT SOFT TISSUE NECK W/DYE: CPT

## 2020-05-21 PROCEDURE — 76705 ECHO EXAM OF ABDOMEN: CPT

## 2020-05-21 PROCEDURE — 87040 BLOOD CULTURE FOR BACTERIA: CPT

## 2020-05-21 RX ADMIN — MORPHINE SULFATE 2 MG: 2 INJECTION, SOLUTION INTRAMUSCULAR; INTRAVENOUS at 20:09

## 2020-05-21 RX ADMIN — IOHEXOL 70 ML: 350 INJECTION, SOLUTION INTRAVENOUS at 18:45

## 2020-05-21 RX ADMIN — Medication 15 ML: at 21:49

## 2020-05-21 ASSESSMENT — ENCOUNTER SYMPTOMS
SORE THROAT: 1
VOICE CHANGE: 0
AGITATION: 0
HEADACHES: 0
FEVER: 1
ABDOMINAL DISTENTION: 0
ABDOMINAL PAIN: 1
TROUBLE SWALLOWING: 0
BACK PAIN: 0
NAUSEA: 1
APNEA: 0
BRUISES/BLEEDS EASILY: 0
EYE PAIN: 0
DIZZINESS: 0
DIARRHEA: 0
COLOR CHANGE: 0
SHORTNESS OF BREATH: 0
RHINORRHEA: 0
WEAKNESS: 0
HALLUCINATIONS: 0
NUMBNESS: 0
ACTIVITY CHANGE: 0
COUGH: 0
VOMITING: 0

## 2020-05-21 ASSESSMENT — PAIN SCALES - GENERAL
PAINLEVEL_OUTOF10: 0
PAINLEVEL_OUTOF10: 7
PAINLEVEL_OUTOF10: 4

## 2020-05-26 LAB
BACTERIA BLD CULT: NORMAL
BACTERIA BLD CULT: NORMAL
REPORT STATUS (RPT): NORMAL
REPORT STATUS (RPT): NORMAL
SPECIMEN SOURCE: NORMAL
SPECIMEN SOURCE: NORMAL

## 2020-07-06 PROCEDURE — 88304 TISSUE EXAM BY PATHOLOGIST: CPT | Performed by: SURGERY

## 2020-08-10 PROBLEM — F41.9 ANXIETY: Status: ACTIVE | Noted: 2020-08-10

## 2020-08-10 PROBLEM — Z86.73 HISTORY OF CVA (CEREBROVASCULAR ACCIDENT): Status: ACTIVE | Noted: 2020-08-10

## 2021-04-05 ENCOUNTER — LAB ENCOUNTER (OUTPATIENT)
Dept: LAB | Facility: HOSPITAL | Age: 56
End: 2021-04-05
Attending: INTERNAL MEDICINE
Payer: COMMERCIAL

## 2021-04-05 ENCOUNTER — OFFICE VISIT (OUTPATIENT)
Dept: RHEUMATOLOGY | Facility: CLINIC | Age: 56
End: 2021-04-05
Payer: COMMERCIAL

## 2021-04-05 VITALS
DIASTOLIC BLOOD PRESSURE: 84 MMHG | HEIGHT: 60 IN | WEIGHT: 157 LBS | HEART RATE: 75 BPM | BODY MASS INDEX: 30.82 KG/M2 | SYSTOLIC BLOOD PRESSURE: 133 MMHG

## 2021-04-05 DIAGNOSIS — R21 RASH: Primary | ICD-10-CM

## 2021-04-05 DIAGNOSIS — R21 RASH: ICD-10-CM

## 2021-04-05 DIAGNOSIS — R53.83 OTHER FATIGUE: ICD-10-CM

## 2021-04-05 PROCEDURE — 86235 NUCLEAR ANTIGEN ANTIBODY: CPT

## 2021-04-05 PROCEDURE — 86431 RHEUMATOID FACTOR QUANT: CPT | Performed by: INTERNAL MEDICINE

## 2021-04-05 PROCEDURE — 3075F SYST BP GE 130 - 139MM HG: CPT | Performed by: INTERNAL MEDICINE

## 2021-04-05 PROCEDURE — 86039 ANTINUCLEAR ANTIBODIES (ANA): CPT

## 2021-04-05 PROCEDURE — 3008F BODY MASS INDEX DOCD: CPT | Performed by: INTERNAL MEDICINE

## 2021-04-05 PROCEDURE — 85652 RBC SED RATE AUTOMATED: CPT | Performed by: INTERNAL MEDICINE

## 2021-04-05 PROCEDURE — 3079F DIAST BP 80-89 MM HG: CPT | Performed by: INTERNAL MEDICINE

## 2021-04-05 PROCEDURE — 86225 DNA ANTIBODY NATIVE: CPT

## 2021-04-05 PROCEDURE — 82306 VITAMIN D 25 HYDROXY: CPT

## 2021-04-05 PROCEDURE — 36415 COLL VENOUS BLD VENIPUNCTURE: CPT | Performed by: INTERNAL MEDICINE

## 2021-04-05 PROCEDURE — 99214 OFFICE O/P EST MOD 30 MIN: CPT | Performed by: INTERNAL MEDICINE

## 2021-04-05 PROCEDURE — 86038 ANTINUCLEAR ANTIBODIES: CPT

## 2021-04-05 PROCEDURE — 86140 C-REACTIVE PROTEIN: CPT | Performed by: INTERNAL MEDICINE

## 2021-04-05 PROCEDURE — 86200 CCP ANTIBODY: CPT | Performed by: INTERNAL MEDICINE

## 2021-04-05 NOTE — PROGRESS NOTES
Akosua Ponce is a 60year old female. 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      Blood work:  Neg RICHI, SSA, SS started back in July 2019 that her left elbow. She was treated with creams  Over the past month she now has new lesions on her right elbow and across her wrist.  Denies any pruritus. She was seen by dermatology and they gave her some more creams.   They d Amitriptyline HCl 10 MG Oral Tab Take 1 tablet (10 mg total) by mouth nightly. 90 tablet 1   • ALPRAZolam (XANAX) 0.5 MG Oral Tab Take 1 tablet (0.5 mg total) by mouth daily as needed.  30 tablet 2   • METOPROLOL SUCCINATE  MG Oral Tablet 24 Hr Take 1 swelling or warmth on palpation  Shoulders: FROM, no pain or swelling or warmth on palpation  Hip: normal log roll, no lateral hip pain, CARLOS test negative b/l  Knees: FROM, no warmth or effusion present. No pain with ROM.    Ankles: FROM, no pain or swell & Units 1/23/2019   RICHI TITER       1:320 (A)   RICHI PATTERN       Speckled (A)   SSA 52 (Ro) (SAMEER) Antibody, IgG      0 - 40 AU/mL 12   SSA 60 (Ro) (SAMEER) Ab, IgG      0 - 40 AU/mL 0   SSA AUTOAB      <100 AU/mL <100   SSB AUTOAB      <100 AU/mL <100   RHEU right-sided neural foraminal  narrowing at C5-C6.  No discrete cord lesions.     MRI L spine 4/2019:  IMPRESSION:  1.  Mild multilevel lumbar spondylosis and concentric disc bulging, without lumbar disc extrusion,  specific nerve root compression, or centra

## 2021-04-05 NOTE — PATIENT INSTRUCTIONS
You were seen for the granuloma annular and to see if it is related anything autoimmune, less likely but due to your fatigue and hair loss we will work you up again for lupus  Blood work today  We will inform you of the results and if further treatment is

## 2021-06-18 ENCOUNTER — OFFICE VISIT (OUTPATIENT)
Dept: ENDOCRINOLOGY CLINIC | Facility: CLINIC | Age: 56
End: 2021-06-18
Payer: COMMERCIAL

## 2021-06-18 VITALS
SYSTOLIC BLOOD PRESSURE: 125 MMHG | DIASTOLIC BLOOD PRESSURE: 78 MMHG | WEIGHT: 160 LBS | BODY MASS INDEX: 31.41 KG/M2 | HEIGHT: 60 IN | HEART RATE: 88 BPM

## 2021-06-18 DIAGNOSIS — L92.0 GRANULOMA ANNULARE: Primary | ICD-10-CM

## 2021-06-18 DIAGNOSIS — E04.1 THYROID NODULE: ICD-10-CM

## 2021-06-18 PROCEDURE — 3008F BODY MASS INDEX DOCD: CPT | Performed by: INTERNAL MEDICINE

## 2021-06-18 PROCEDURE — 3078F DIAST BP <80 MM HG: CPT | Performed by: INTERNAL MEDICINE

## 2021-06-18 PROCEDURE — 3074F SYST BP LT 130 MM HG: CPT | Performed by: INTERNAL MEDICINE

## 2021-06-18 PROCEDURE — 99214 OFFICE O/P EST MOD 30 MIN: CPT | Performed by: INTERNAL MEDICINE

## 2021-06-18 NOTE — PROGRESS NOTES
Name: Maria M Ramos  Date: 6/18/2021    Referring Physician: No ref. provider found    No chief complaint on file. HISTORY OF PRESENT ILLNESS   Maria M Ramos is a 60year old female who presents for No chief complaint on file.     59 y/o F pres moisturized skin    Medications:     Current Outpatient Medications:   •  DULoxetine HCl 30 MG Oral Cap DR Particles, Take 1 capsule (30 mg total) by mouth daily. , Disp: 90 capsule, Rfl: 1  •  mupirocin 2 % External Ointment, Apply 1 Application topically file      Number of children: Not on file      Years of education: Not on file      Highest education level: Not on file    Tobacco Use      Smoking status: Former Smoker        Packs/day: 1.00        Years: 20.00        Pack years: 20        Types: [employer] Surgical History:   Procedure Laterality Date   • CHOLECYSTECTOMY     • COLONOSCOPY  2012, 4/18   • EGD  2012    normal dd bx   • IMPLANT LEFT  2004   • IMPLANT RIGHT  2004   • TONSILLECTOMY         PHYSICAL EXAMINATION:  Ht 5' (1.524 m)   Wt 160 lb (72.6

## 2021-06-19 ENCOUNTER — LAB ENCOUNTER (OUTPATIENT)
Dept: LAB | Facility: HOSPITAL | Age: 56
End: 2021-06-19
Attending: INTERNAL MEDICINE
Payer: COMMERCIAL

## 2021-06-19 DIAGNOSIS — L92.0 GRANULOMA ANNULARE: ICD-10-CM

## 2021-06-19 PROCEDURE — 82533 TOTAL CORTISOL: CPT

## 2021-06-19 PROCEDURE — 83036 HEMOGLOBIN GLYCOSYLATED A1C: CPT

## 2021-06-19 PROCEDURE — 83516 IMMUNOASSAY NONANTIBODY: CPT

## 2021-06-19 PROCEDURE — 84439 ASSAY OF FREE THYROXINE: CPT

## 2021-06-19 PROCEDURE — 36415 COLL VENOUS BLD VENIPUNCTURE: CPT

## 2021-06-19 PROCEDURE — 86376 MICROSOMAL ANTIBODY EACH: CPT

## 2021-06-19 PROCEDURE — 80053 COMPREHEN METABOLIC PANEL: CPT

## 2021-06-19 PROCEDURE — 84443 ASSAY THYROID STIM HORMONE: CPT

## 2021-06-23 ENCOUNTER — PATIENT MESSAGE (OUTPATIENT)
Dept: ENDOCRINOLOGY CLINIC | Facility: CLINIC | Age: 56
End: 2021-06-23

## 2021-06-23 NOTE — TELEPHONE ENCOUNTER
From: Jack Mcmanus  To: Shannan Myles MD  Sent: 6/23/2021 1:42 PM CDT  Subject: Test Results Question    Hi Dr. Keyon Manrique,    I noticed some of my test results are high but my Tissue Transglutaminase IgG Misha- and Tissue Transglutaminase IgA Ab-are extremel

## 2022-07-17 ENCOUNTER — HOSPITAL ENCOUNTER (EMERGENCY)
Facility: HOSPITAL | Age: 57
Discharge: HOME | End: 2022-07-18
Payer: COMMERCIAL

## 2022-07-17 ENCOUNTER — APPOINTMENT (OUTPATIENT)
Dept: CT IMAGING | Facility: HOSPITAL | Age: 57
End: 2022-07-17
Attending: NURSE PRACTITIONER
Payer: COMMERCIAL

## 2022-07-17 DIAGNOSIS — R74.8 ELEVATED LIPASE: ICD-10-CM

## 2022-07-17 DIAGNOSIS — R10.9 ABDOMINAL PAIN, ACUTE: Primary | ICD-10-CM

## 2022-07-17 LAB
ALBUMIN SERPL-MCNC: 3.6 G/DL (ref 3.4–5)
ALBUMIN/GLOB SERPL: 0.9 {RATIO} (ref 1–2)
ALP LIVER SERPL-CCNC: 113 U/L
ALT SERPL-CCNC: 54 U/L
ANION GAP SERPL CALC-SCNC: 9 MMOL/L (ref 0–18)
AST SERPL-CCNC: 54 U/L (ref 15–37)
BASOPHILS # BLD AUTO: 0.03 X10(3) UL (ref 0–0.2)
BASOPHILS NFR BLD AUTO: 0.3 %
BILIRUB SERPL-MCNC: 0.6 MG/DL (ref 0.1–2)
BILIRUB UR QL: NEGATIVE
BUN BLD-MCNC: 13 MG/DL (ref 7–18)
BUN/CREAT SERPL: 18.3 (ref 10–20)
CALCIUM BLD-MCNC: 9.6 MG/DL (ref 8.5–10.1)
CHLORIDE SERPL-SCNC: 106 MMOL/L (ref 98–112)
CO2 SERPL-SCNC: 24 MMOL/L (ref 21–32)
COLOR UR: YELLOW
CREAT BLD-MCNC: 0.71 MG/DL
DEPRECATED RDW RBC AUTO: 50.2 FL (ref 35.1–46.3)
EOSINOPHIL # BLD AUTO: 0.31 X10(3) UL (ref 0–0.7)
EOSINOPHIL NFR BLD AUTO: 3.6 %
ERYTHROCYTE [DISTWIDTH] IN BLOOD BY AUTOMATED COUNT: 13 % (ref 11–15)
GLOBULIN PLAS-MCNC: 3.8 G/DL (ref 2.8–4.4)
GLUCOSE BLD-MCNC: 88 MG/DL (ref 70–99)
GLUCOSE UR-MCNC: NEGATIVE MG/DL
HCT VFR BLD AUTO: 40.6 %
HGB BLD-MCNC: 13.4 G/DL
HGB UR QL STRIP.AUTO: NEGATIVE
IMM GRANULOCYTES # BLD AUTO: 0.03 X10(3) UL (ref 0–1)
IMM GRANULOCYTES NFR BLD: 0.3 %
KETONES UR-MCNC: 80 MG/DL
LIPASE SERPL-CCNC: 778 U/L (ref 73–393)
LYMPHOCYTES # BLD AUTO: 2.2 X10(3) UL (ref 1–4)
LYMPHOCYTES NFR BLD AUTO: 25.3 %
MCH RBC QN AUTO: 34.7 PG (ref 26–34)
MCHC RBC AUTO-ENTMCNC: 33 G/DL (ref 31–37)
MCV RBC AUTO: 105.2 FL
MONOCYTES # BLD AUTO: 0.77 X10(3) UL (ref 0.1–1)
MONOCYTES NFR BLD AUTO: 8.9 %
NEUTROPHILS # BLD AUTO: 5.34 X10 (3) UL (ref 1.5–7.7)
NEUTROPHILS # BLD AUTO: 5.34 X10(3) UL (ref 1.5–7.7)
NEUTROPHILS NFR BLD AUTO: 61.6 %
NITRITE UR QL STRIP.AUTO: NEGATIVE
OSMOLALITY SERPL CALC.SUM OF ELEC: 288 MOSM/KG (ref 275–295)
PH UR: 5 [PH] (ref 5–8)
PLATELET # BLD AUTO: 188 10(3)UL (ref 150–450)
POTASSIUM SERPL-SCNC: 3.5 MMOL/L (ref 3.5–5.1)
PROT SERPL-MCNC: 7.4 G/DL (ref 6.4–8.2)
PROT UR-MCNC: NEGATIVE MG/DL
RBC # BLD AUTO: 3.86 X10(6)UL
SODIUM SERPL-SCNC: 139 MMOL/L (ref 136–145)
SP GR UR STRIP: 1.03 (ref 1–1.03)
UROBILINOGEN UR STRIP-ACNC: 2
VIT C UR-MCNC: 40 MG/DL
WBC # BLD AUTO: 8.7 X10(3) UL (ref 4–11)

## 2022-07-17 PROCEDURE — 83690 ASSAY OF LIPASE: CPT | Performed by: NURSE PRACTITIONER

## 2022-07-17 PROCEDURE — 96374 THER/PROPH/DIAG INJ IV PUSH: CPT

## 2022-07-17 PROCEDURE — 99284 EMERGENCY DEPT VISIT MOD MDM: CPT

## 2022-07-17 PROCEDURE — 99285 EMERGENCY DEPT VISIT HI MDM: CPT

## 2022-07-17 PROCEDURE — 96361 HYDRATE IV INFUSION ADD-ON: CPT

## 2022-07-17 PROCEDURE — 93010 ELECTROCARDIOGRAM REPORT: CPT | Performed by: NURSE PRACTITIONER

## 2022-07-17 PROCEDURE — 93005 ELECTROCARDIOGRAM TRACING: CPT

## 2022-07-17 PROCEDURE — 87086 URINE CULTURE/COLONY COUNT: CPT | Performed by: NURSE PRACTITIONER

## 2022-07-17 PROCEDURE — 80053 COMPREHEN METABOLIC PANEL: CPT | Performed by: NURSE PRACTITIONER

## 2022-07-17 PROCEDURE — 81001 URINALYSIS AUTO W/SCOPE: CPT | Performed by: NURSE PRACTITIONER

## 2022-07-17 PROCEDURE — 96375 TX/PRO/DX INJ NEW DRUG ADDON: CPT

## 2022-07-17 PROCEDURE — 85025 COMPLETE CBC W/AUTO DIFF WBC: CPT | Performed by: NURSE PRACTITIONER

## 2022-07-17 PROCEDURE — 74177 CT ABD & PELVIS W/CONTRAST: CPT | Performed by: NURSE PRACTITIONER

## 2022-07-17 RX ORDER — MORPHINE SULFATE 4 MG/ML
4 INJECTION, SOLUTION INTRAMUSCULAR; INTRAVENOUS ONCE
Status: COMPLETED | OUTPATIENT
Start: 2022-07-17 | End: 2022-07-17

## 2022-07-17 RX ORDER — ONDANSETRON 2 MG/ML
4 INJECTION INTRAMUSCULAR; INTRAVENOUS ONCE
Status: COMPLETED | OUTPATIENT
Start: 2022-07-17 | End: 2022-07-17

## 2022-07-18 VITALS
SYSTOLIC BLOOD PRESSURE: 110 MMHG | OXYGEN SATURATION: 98 % | BODY MASS INDEX: 31 KG/M2 | TEMPERATURE: 97 F | HEART RATE: 78 BPM | RESPIRATION RATE: 16 BRPM | DIASTOLIC BLOOD PRESSURE: 54 MMHG | WEIGHT: 160 LBS

## 2022-07-18 RX ORDER — ONDANSETRON 4 MG/1
4 TABLET, ORALLY DISINTEGRATING ORAL EVERY 4 HOURS PRN
Qty: 10 TABLET | Refills: 0 | Status: SHIPPED | OUTPATIENT
Start: 2022-07-18 | End: 2022-07-25

## 2022-07-19 ENCOUNTER — PATIENT OUTREACH (OUTPATIENT)
Dept: CASE MANAGEMENT | Age: 57
End: 2022-07-19

## 2022-07-19 NOTE — PROGRESS NOTES
VM received; pt requesting assistance w/scheduling apt (dc 07/18)    Dr Nighat Meeks, Internal Medicine  [address]  [address] [place] [phone]  Conference pt w/office; the office wanted to ask questions then transferred pt to the nurse to see if she could get in sooner.   Closing encounter

## 2022-07-21 ENCOUNTER — HOSPITAL ENCOUNTER (OUTPATIENT)
Dept: MRI IMAGING | Facility: HOSPITAL | Age: 57
Discharge: HOME | End: 2022-07-21
Attending: INTERNAL MEDICINE
Payer: COMMERCIAL

## 2022-07-21 DIAGNOSIS — R10.13 EPIGASTRIC PAIN: ICD-10-CM

## 2022-07-21 DIAGNOSIS — R74.8 ELEVATED LIPASE: ICD-10-CM

## 2022-07-21 PROCEDURE — 74183 MRI ABD W/O CNTR FLWD CNTR: CPT | Performed by: INTERNAL MEDICINE

## 2022-07-21 PROCEDURE — A9575 INJ GADOTERATE MEGLUMI 0.1ML: HCPCS | Performed by: INTERNAL MEDICINE

## 2022-07-21 PROCEDURE — 76376 3D RENDER W/INTRP POSTPROCES: CPT | Performed by: INTERNAL MEDICINE

## 2022-09-14 ENCOUNTER — LAB ENCOUNTER (OUTPATIENT)
Dept: LAB | Age: 57
End: 2022-09-14
Attending: INTERNAL MEDICINE
Payer: COMMERCIAL

## 2022-09-14 DIAGNOSIS — Z01.812 ENCOUNTER FOR PREPROCEDURE SCREENING LABORATORY TESTING FOR COVID-19: ICD-10-CM

## 2022-09-14 DIAGNOSIS — Z20.822 ENCOUNTER FOR PREPROCEDURE SCREENING LABORATORY TESTING FOR COVID-19: ICD-10-CM

## 2022-09-15 LAB — SARS-COV-2 RNA RESP QL NAA+PROBE: NOT DETECTED

## 2022-09-16 ENCOUNTER — ANESTHESIA EVENT (OUTPATIENT)
Dept: ENDOSCOPY | Facility: HOSPITAL | Age: 57
End: 2022-09-16
Payer: COMMERCIAL

## 2022-09-16 ENCOUNTER — ANESTHESIA (OUTPATIENT)
Dept: ENDOSCOPY | Facility: HOSPITAL | Age: 57
End: 2022-09-16
Payer: COMMERCIAL

## 2022-09-16 ENCOUNTER — HOSPITAL ENCOUNTER (OUTPATIENT)
Facility: HOSPITAL | Age: 57
Setting detail: HOSPITAL OUTPATIENT SURGERY
Discharge: HOME | End: 2022-09-16
Attending: INTERNAL MEDICINE | Admitting: INTERNAL MEDICINE

## 2022-09-16 VITALS
OXYGEN SATURATION: 98 % | DIASTOLIC BLOOD PRESSURE: 65 MMHG | SYSTOLIC BLOOD PRESSURE: 103 MMHG | HEIGHT: 60 IN | RESPIRATION RATE: 16 BRPM | BODY MASS INDEX: 31.41 KG/M2 | TEMPERATURE: 97 F | WEIGHT: 160 LBS | HEART RATE: 75 BPM

## 2022-09-16 DIAGNOSIS — Z20.822 ENCOUNTER FOR PREPROCEDURE SCREENING LABORATORY TESTING FOR COVID-19: Primary | ICD-10-CM

## 2022-09-16 DIAGNOSIS — Z01.812 ENCOUNTER FOR PREPROCEDURE SCREENING LABORATORY TESTING FOR COVID-19: Primary | ICD-10-CM

## 2022-09-16 PROCEDURE — 0DB78ZX EXCISION OF STOMACH, PYLORUS, VIA NATURAL OR ARTIFICIAL OPENING ENDOSCOPIC, DIAGNOSTIC: ICD-10-PCS | Performed by: INTERNAL MEDICINE

## 2022-09-16 PROCEDURE — BD47ZZZ ULTRASONOGRAPHY OF GASTROINTESTINAL TRACT: ICD-10-PCS | Performed by: INTERNAL MEDICINE

## 2022-09-16 PROCEDURE — 88305 TISSUE EXAM BY PATHOLOGIST: CPT | Performed by: INTERNAL MEDICINE

## 2022-09-16 PROCEDURE — 0DB98ZX EXCISION OF DUODENUM, VIA NATURAL OR ARTIFICIAL OPENING ENDOSCOPIC, DIAGNOSTIC: ICD-10-PCS | Performed by: INTERNAL MEDICINE

## 2022-09-16 PROCEDURE — 0DJ08ZZ INSPECTION OF UPPER INTESTINAL TRACT, VIA NATURAL OR ARTIFICIAL OPENING ENDOSCOPIC: ICD-10-PCS | Performed by: INTERNAL MEDICINE

## 2022-09-16 RX ORDER — SODIUM CHLORIDE, SODIUM LACTATE, POTASSIUM CHLORIDE, CALCIUM CHLORIDE 600; 310; 30; 20 MG/100ML; MG/100ML; MG/100ML; MG/100ML
INJECTION, SOLUTION INTRAVENOUS CONTINUOUS
Status: DISCONTINUED | OUTPATIENT
Start: 2022-09-16 | End: 2022-09-16

## 2022-09-16 RX ORDER — LIDOCAINE HYDROCHLORIDE 10 MG/ML
INJECTION, SOLUTION EPIDURAL; INFILTRATION; INTRACAUDAL; PERINEURAL AS NEEDED
Status: DISCONTINUED | OUTPATIENT
Start: 2022-09-16 | End: 2022-09-16 | Stop reason: SURG

## 2022-09-16 RX ADMIN — LIDOCAINE HYDROCHLORIDE 25 MG: 10 INJECTION, SOLUTION EPIDURAL; INFILTRATION; INTRACAUDAL; PERINEURAL at 07:45:00

## 2022-09-16 RX ADMIN — SODIUM CHLORIDE, SODIUM LACTATE, POTASSIUM CHLORIDE, CALCIUM CHLORIDE: 600; 310; 30; 20 INJECTION, SOLUTION INTRAVENOUS at 07:43:00

## 2022-09-16 NOTE — OPERATIVE REPORT
[facility] OPERATIVE REPORT   PATIENT NAME: Brynn Whitfield  MRN: [id number]  DATE OF OPERATION: 9/16/2022  PREOPERATIVE DIAGNOSIS: idiopathic pancreatitis- excessive daily alcohol use; + RICHI but neg IgG subclass 4; neg MRI for pancreatic neoplasm; minimally elevated liver tests; post cholecystectomy  POSTOPERATIVE DIAGNOSIS:    1. Fatty appearing liver and pancreas   2. Very small caliber pancreatic duct without pancreas divisum   3. No EUS criteria for chronic pancreatitis   4. No choledocholithiasis   5. Superficial gastric antral ulcer and mild antral gastritis  PROCEDURE PERFORMED: upper endoscopy / ENDOSCOPIC ultrasound  SEDATION MEDICATIONS: MAC  PREOPERATIVE MEDICATIONS:   PREPROCEDURE ASSESSMENT: The indication for this procedure is to assess for ulcers and pancreatic mass. The patient was identified by myself and nursing staff in the exam room. Informed consent was obtained. The patient was seen in clinic and a full H&P was obtained. On brief physical examination, airway is patent. Chest is clear. Heart has regular rate and rhythm. Abdomen is soft, nontender with good bowel sounds. A medication list was taken by nursing today and reviewed by myself. The patient is an ASA grade 2. PROCEDURE NOTE: The procedure was completed without difficulty. The patient tolerated the procedure well. Upper endoscopy: The endoscope was inserted through the mouth and advanced to the level of the duodenum, 3rd portion. Visualized portion of the esophagus, stomach including antrum, body, fundus and cardiac, and duodenum were normal except for superficial antral ulcer and gastritis. Small bowel and gastric biopsies were taken for celiac disease and H pylori. Endoscopic ultrasound  Endoscopic ultrasound was performed using the linear echoendoscope. Images were obtained. LIVER: Left lobe of the liver was visualized and no mass or lesions seen. No intrahepatic duct dilation was noted.   Portal vein was noted to come out of the liver and the portal confluence was seen and pancreas was noted. PANCREAS:  Pancreatic neck, body, and tail were interrogated. They were without any evidence of chronic pancreatitis, neoplasms, or cysts. Pancreatic duct was normal in caliber throughout. Pancreatic duct was seen and measured 0.9 mm in the body and was seen going toward the head of pancreas. The pancreatic tail was seen next to the left kidney and spleen. The scope was advanced into the duodenal bulb and the pancreatic head was interrogated next. The pancreatic duct in the head of pancreas was normal measuring 1.6 mm. It was seen going from the ampulla to the body of the pancreas around the confluence excluding the possibility of pancreas divisum. The head of pancreas was normal without mass or cyst.  Scope was advanced to the 2nd duodenum. The Aorta and IVC were visualized. The pancreatic uncinate was visualized and was normal.  Both CBD and PD were seen emerging and traversing the darker ventral enlage. PD was here seen going to the body of the pancreas once again ruling out pancreas divisum. COMMON BILE DUCT: CBD was interrogated from the duodenal bulb. .  It was normal caliber measuring 4.3 mm and was traced from the liver hilum down to the ampulla. No stones or sludge was seen. GALLBLADDER: absent  CELIAC AXIS:  visualized without lymphadenopathy  L ADRENAL GLAND:  visualized   L KIDNEY:  visualized   MEDIASTINUM:  visualized without lymphadenopathy  Scope was withdrawn from the patient and patient tolerated the procedure well. FINDINGS   1. No anatomic etiology of pancreatitis identified. Advised to refrain from alcohol. 2. Gastric ulcer. ? Stress, NSAIDS, or H pylori.   Omeprazole 3 mo daily  RECOMMENDATIONS: follow up with Dr. Eugene Amos, [facility]  DISCHARGE:  On discharge, the patient was given an after-visit summary detailing the procedure, findings, followup plans, and an updated medication list. Thank you very much for the consultation. I really appreciate it.     Sharifa Handley MD

## 2022-09-16 NOTE — ANESTHESIA POSTPROCEDURE EVALUATION
[address] Patient Status:  Hospital Outpatient Surgery   Age/Gender [address] [place] old female MRN [id number]   Location [address] [place] [address] Attending Lucretia Lawrence MD   Hosp Day # 0 PCP Barron Wyatt MD       Anesthesia Post-op Note    ENDOSCOPIC ULTRASOUND (EUS) with biopsies    Procedure Summary     Date: 09/16/22 Room / Location: [address] [place] [address] ENDOSCOPY 03 / [facility] ENDOSCOPY    Anesthesia Start: 8222 Anesthesia Stop: 0880    Procedure: ENDOSCOPIC ULTRASOUND (EUS) with biopsies (N/A ) Diagnosis: (gastric ulcer)    Surgeons: Lucretia Lawrence MD Anesthesiologist: Flaco Haq MD    Anesthesia Type: MAC ASA Status: 2          Anesthesia Type: MAC    Vitals Value Taken Time   /65 09/16/22 0815   Temp 98.0 09/16/22 0843   Pulse 74 09/16/22 0818   Resp 16 09/16/22 0800   SpO2 97 % 09/16/22 0818   Vitals shown include unvalidated device data. Patient Location: Endoscopy    Anesthesia Type: MAC    Airway Patency: patent    Postop Pain Control: adequate    Mental Status: mildly sedated but able to meaningfully participate in the post-anesthesia evaluation    Nausea/Vomiting: none    Cardiopulmonary/Hydration status: stable euvolemic    Complications: no apparent anesthesia related complications    Postop vital signs: stable    Dental Exam: Unchanged from Preop    Patient to be discharged home when criteria met.

## 2024-01-30 ENCOUNTER — HOSPITAL ENCOUNTER (EMERGENCY)
Facility: HOSPITAL | Age: 59
Discharge: HOME | End: 2024-01-30
Attending: EMERGENCY MEDICINE
Payer: COMMERCIAL

## 2024-01-30 VITALS
HEART RATE: 75 BPM | DIASTOLIC BLOOD PRESSURE: 70 MMHG | BODY MASS INDEX: 29.45 KG/M2 | HEIGHT: 60 IN | RESPIRATION RATE: 16 BRPM | SYSTOLIC BLOOD PRESSURE: 134 MMHG | OXYGEN SATURATION: 97 % | TEMPERATURE: 97 F | WEIGHT: 150 LBS

## 2024-01-30 DIAGNOSIS — R79.89 ELEVATED LFTS: ICD-10-CM

## 2024-01-30 DIAGNOSIS — K85.80 OTHER ACUTE PANCREATITIS WITHOUT INFECTION OR NECROSIS: Primary | ICD-10-CM

## 2024-01-30 LAB
ALBUMIN SERPL-MCNC: 4.2 G/DL (ref 3.2–4.8)
ALBUMIN/GLOB SERPL: 1.5 {RATIO} (ref 1–2)
ALP LIVER SERPL-CCNC: 164 U/L
ALT SERPL-CCNC: 83 U/L
AMPHET UR QL SCN: NEGATIVE
ANION GAP SERPL CALC-SCNC: 6 MMOL/L (ref 0–18)
AST SERPL-CCNC: 127 U/L (ref ?–34)
BARBITURATES UR QL SCN: NEGATIVE
BASOPHILS # BLD AUTO: 0.02 X10(3) UL (ref 0–0.2)
BASOPHILS NFR BLD AUTO: 0.3 %
BENZODIAZ UR QL SCN: NEGATIVE
BILIRUB SERPL-MCNC: 0.5 MG/DL (ref 0.2–1.1)
BUN BLD-MCNC: 11 MG/DL (ref 9–23)
BUN/CREAT SERPL: 16.9 (ref 10–20)
CALCIUM BLD-MCNC: 9.4 MG/DL (ref 8.7–10.4)
CHLORIDE SERPL-SCNC: 111 MMOL/L (ref 98–112)
CO2 SERPL-SCNC: 25 MMOL/L (ref 21–32)
COCAINE UR QL: NEGATIVE
CREAT BLD-MCNC: 0.65 MG/DL
CREAT UR-SCNC: 162.1 MG/DL
DEPRECATED RDW RBC AUTO: 50 FL (ref 35.1–46.3)
EGFRCR SERPLBLD CKD-EPI 2021: 101 ML/MIN/1.73M2 (ref 60–?)
EOSINOPHIL # BLD AUTO: 0.31 X10(3) UL (ref 0–0.7)
EOSINOPHIL NFR BLD AUTO: 5 %
ERYTHROCYTE [DISTWIDTH] IN BLOOD BY AUTOMATED COUNT: 13.2 % (ref 11–15)
ETHANOL SERPL-MCNC: <3 MG/DL (ref ?–3)
GLOBULIN PLAS-MCNC: 2.8 G/DL (ref 2.8–4.4)
GLUCOSE BLD-MCNC: 87 MG/DL (ref 70–99)
HCT VFR BLD AUTO: 37.6 %
HGB BLD-MCNC: 12.2 G/DL
IMM GRANULOCYTES # BLD AUTO: 0.01 X10(3) UL (ref 0–1)
IMM GRANULOCYTES NFR BLD: 0.2 %
LIPASE SERPL-CCNC: 77 U/L (ref 13–75)
LYMPHOCYTES # BLD AUTO: 1.61 X10(3) UL (ref 1–4)
LYMPHOCYTES NFR BLD AUTO: 26 %
MCH RBC QN AUTO: 33.4 PG (ref 26–34)
MCHC RBC AUTO-ENTMCNC: 32.4 G/DL (ref 31–37)
MCV RBC AUTO: 103 FL
METHADONE UR QL SCN: NEGATIVE
MONOCYTES # BLD AUTO: 0.49 X10(3) UL (ref 0.1–1)
MONOCYTES NFR BLD AUTO: 7.9 %
NEUTROPHILS # BLD AUTO: 3.75 X10 (3) UL (ref 1.5–7.7)
NEUTROPHILS # BLD AUTO: 3.75 X10(3) UL (ref 1.5–7.7)
NEUTROPHILS NFR BLD AUTO: 60.6 %
OSMOLALITY SERPL CALC.SUM OF ELEC: 293 MOSM/KG (ref 275–295)
OXYCODONE UR QL SCN: NEGATIVE
PCP UR QL SCN: NEGATIVE
PLATELET # BLD AUTO: 136 10(3)UL (ref 150–450)
POTASSIUM SERPL-SCNC: 3.9 MMOL/L (ref 3.5–5.1)
PROT SERPL-MCNC: 7 G/DL (ref 5.7–8.2)
RBC # BLD AUTO: 3.65 X10(6)UL
SODIUM SERPL-SCNC: 142 MMOL/L (ref 136–145)
WBC # BLD AUTO: 6.2 X10(3) UL (ref 4–11)

## 2024-01-30 PROCEDURE — 82077 ASSAY SPEC XCP UR&BREATH IA: CPT | Performed by: EMERGENCY MEDICINE

## 2024-01-30 PROCEDURE — 83690 ASSAY OF LIPASE: CPT

## 2024-01-30 PROCEDURE — 80053 COMPREHEN METABOLIC PANEL: CPT

## 2024-01-30 PROCEDURE — 96361 HYDRATE IV INFUSION ADD-ON: CPT

## 2024-01-30 PROCEDURE — 99285 EMERGENCY DEPT VISIT HI MDM: CPT

## 2024-01-30 PROCEDURE — 83690 ASSAY OF LIPASE: CPT | Performed by: EMERGENCY MEDICINE

## 2024-01-30 PROCEDURE — 80307 DRUG TEST PRSMV CHEM ANLYZR: CPT | Performed by: EMERGENCY MEDICINE

## 2024-01-30 PROCEDURE — 80053 COMPREHEN METABOLIC PANEL: CPT | Performed by: EMERGENCY MEDICINE

## 2024-01-30 PROCEDURE — 99284 EMERGENCY DEPT VISIT MOD MDM: CPT

## 2024-01-30 PROCEDURE — 85025 COMPLETE CBC W/AUTO DIFF WBC: CPT | Performed by: EMERGENCY MEDICINE

## 2024-01-30 PROCEDURE — 85025 COMPLETE CBC W/AUTO DIFF WBC: CPT

## 2024-01-30 PROCEDURE — 96374 THER/PROPH/DIAG INJ IV PUSH: CPT

## 2024-01-30 PROCEDURE — 96375 TX/PRO/DX INJ NEW DRUG ADDON: CPT

## 2024-01-30 RX ORDER — HYDROCODONE BITARTRATE AND ACETAMINOPHEN 5; 325 MG/1; MG/1
1 TABLET ORAL EVERY 6 HOURS PRN
Qty: 10 TABLET | Refills: 0 | Status: SHIPPED | OUTPATIENT
Start: 2024-01-30 | End: 2024-02-06

## 2024-01-30 RX ORDER — ONDANSETRON 2 MG/ML
4 INJECTION INTRAMUSCULAR; INTRAVENOUS ONCE
Status: COMPLETED | OUTPATIENT
Start: 2024-01-30 | End: 2024-01-30

## 2024-01-30 RX ORDER — MORPHINE SULFATE 4 MG/ML
4 INJECTION, SOLUTION INTRAMUSCULAR; INTRAVENOUS ONCE
Status: COMPLETED | OUTPATIENT
Start: 2024-01-30 | End: 2024-01-30

## 2024-01-31 NOTE — ED INITIAL ASSESSMENT (HPI)
Pt presents to ED from IC for pancreatitis. Denies N/V. Pt had elevated lipase at IC and was told to come to ED.

## 2024-01-31 NOTE — ED PROVIDER NOTES
Patient Seen in: [facility] Emergency Department    History     Chief Complaint   Patient presents with    Abdominal Pain       HPI    History is provided by patient/independent historian: patient, patient's son's  58-year-old female with history of pancreatitis, here with complaints of abdominal pain for the past 3 days.  She reports no p.o. intake in the last few days secondary to severe pain that is not relieved by Tylenol, Motrin, Aleve.  She was seen at immediate care and had an elevated lipase level and CT that showed pancreatitis and was sent here for further evaluation.  Patient's son's report that she does drink alcohol, nothing recently.    History reviewed.   Past Medical History:   Diagnosis Date    Antral ulcer, acute 8/8/2019    Anxiety and depression     Articular cartilage disorder of wrist, right            GLOBAL EXP 7-14-16 / RIGHT WRIST / BAM / TC  4/25/2016    Biliary sludge 8/8/2019    Carpal tunnel syndrome of right wrist 2/19/2016    Cerebrovascular accident (CVA) of left thalamus (HCC) 8/8/2019    Cervicalgia 12/1/2015    Essential hypertension     Granuloma annulare     Hepatic steatosis 5/27/2014    High blood pressure     High cholesterol     History of laparoscopic cholecystectomy     History of non anemic vitamin B12 deficiency 4/24/2014    History of panic attacks     Hypertension 4/24/2014    IBS (irritable bowel syndrome) 4/29/2014    Macrocytosis without anemia     Nondisp fx of proximal phalanx of right great toe, init global 6/12/2018    SARAH (obstructive sleep apnea) 12/23/18 Split Night    AHI 19 RDI 19 REM AHI 0 Supine AHI 10 non-supine AHI 21 Sao2 Min 86%     Plantar fascial fibromatosis          GLOBAL EXP 11-2-16 / LEFT FOOT / SV / DJB  8/19/2016    Plantar fascial fibromatosis of left foot 12/1/2015    Positive RICHI (antinuclear antibody) 8/8/2019    Rhinitis     Sleep apnea     CPAP therapy; sees pulmonary    Ulnar neuropathy at elbow, right     EMG 11/16          History reviewed.   Past Surgical History:   Procedure Laterality Date    CHOLECYSTECTOMY      COLONOSCOPY  ,     EGD  2012    normal dd bx    IMPLANT LEFT  2004    IMPLANT RIGHT  2004    TONSILLECTOMY           Home Medications reviewed :  (Not in a hospital admission)        History reviewed.   Social History     Socioeconomic History    Marital status:    Tobacco Use    Smoking status: Former     Packs/day: 1.00     Years: 20.00     Additional pack years: 0.00     Total pack years: 20.00     Types: Cigarettes     Start date: 1979     Quit date: 2013     Years since quittin.0    Smokeless tobacco: Former    Tobacco comments:     max 1 pack averaged < 1 pack a day    Vaping Use    Vaping Use: Never used   Substance and Sexual Activity    Alcohol use: Yes     Alcohol/week: 5.8 standard drinks of alcohol     Types: 7 Glasses of wine per week     Comment: 2-3 per week    Drug use: No         ROS  Review of Systems   Respiratory:  Negative for shortness of breath.    Cardiovascular:  Negative for chest pain.   Gastrointestinal:  Positive for abdominal pain and nausea.   All other systems reviewed and are negative.     All other pertinent organ systems are reviewed and are negative.      Physical Exam     ED Triage Vitals [24 1820]   /60   Pulse 84   Resp 20   Temp 97.2 °F (36.2 °C)   Temp src Temporal   SpO2 95 %   O2 Device None (Room air)     Vital signs reviewed.      Physical Exam  Vitals and nursing note reviewed.   Cardiovascular:      Pulses: Normal pulses.   Pulmonary:      Effort: No respiratory distress.   Abdominal:      General: There is no distension.      Tenderness: There is generalized abdominal tenderness (to light touch).   Neurological:      Mental Status: She is alert.         ED Course       Labs:     Labs Reviewed   COMP METABOLIC PANEL (14) - Abnormal; Notable for the following components:       Result Value    ALT 83 (*)      (*)      Alkaline Phosphatase 164 (*)     All other components within normal limits   LIPASE - Abnormal; Notable for the following components:    Lipase 77 (*)     All other components within normal limits   DRUG ABUSE PANEL 10 SCREEN - Abnormal; Notable for the following components:    Cannabinoid Urine Presumed Positive (*)     Ecstasy Urine Presumed Positive (*)     Opiate Urine Presumed Positive (*)     All other components within normal limits   CBC W/ DIFFERENTIAL - Abnormal; Notable for the following components:    RBC 3.65 (*)     .0 (*)     RDW-SD 50.0 (*)     .0 (*)     All other components within normal limits   ETHYL ALCOHOL - Normal   CBC WITH DIFFERENTIAL WITH PLATELET    Narrative:     The following orders were created for panel order CBC With Differential With Platelet.                  Procedure                               Abnormality         Status                                     ---------                               -----------         ------                                     CBC W/ DIFFERENTIAL[[id number]]          Abnormal            Final result                                                 Please view results for these tests on the individual orders.   RAINBOW DRAW LAVENDER   RAINBOW DRAW LIGHT GREEN         My EKG Interpretation:   As reviewed and Interpreted by me      Imaging Results Available and Reviewed while in ED:   No results found.      Decision rules/scores evaluated: none      Diagnostic labs/tests considered but not ordered: CT abd/pelvis    ED Medications Administered:   Medications   morphINE PF 4 MG/ML injection 4 mg (4 mg Intravenous Given 1/30/24 2101)   ondansetron (Zofran) 4 MG/2ML injection 4 mg (4 mg Intravenous Given 1/30/24 2102)   sodium chloride 0.9 % IV bolus 1,000 mL (0 mL Intravenous Stopped 1/30/24 2254)            - repeat exam nontender    MDM       Medical Decision Making      Differential Diagnosis: After obtaining the patient's history,  performing the physical exam and reviewing the diagnostics, multiple initial diagnoses were considered based on the presenting problem including pancreatitis, pyelonephritis, UTI, gastroenteritis    External document review: I personally reviewed available external medical records for any recent pertinent discharge summaries, testing, and procedures - the findings are as follows: IC visit with REJI Marques with lipase 120 with mild pancreatitis on CT abd/pelvis    Complicating Factors: The patient already  has a past medical history of Antral ulcer, acute (8/8/2019), Anxiety and depression, Articular cartilage disorder of wrist, right            GLOBAL EXP 7-14-16 / RIGHT WRIST / BAM / TC  (4/25/2016), Biliary sludge (8/8/2019), Carpal tunnel syndrome of right wrist (2/19/2016), Cerebrovascular accident (CVA) of left thalamus (HCC) (8/8/2019), Cervicalgia (12/1/2015), Essential hypertension, Granuloma annulare, Hepatic steatosis (5/27/2014), High blood pressure, High cholesterol, History of laparoscopic cholecystectomy, History of non anemic vitamin B12 deficiency (4/24/2014), History of panic attacks, Hypertension (4/24/2014), IBS (irritable bowel syndrome) (4/29/2014), Macrocytosis without anemia, Nondisp fx of proximal phalanx of right great toe, init global (6/12/2018), SARAH (obstructive sleep apnea) (12/23/18 Split Night), Plantar fascial fibromatosis          GLOBAL EXP 11-2-16 / LEFT FOOT / SV / DJB  (8/19/2016), Plantar fascial fibromatosis of left foot (12/1/2015), Positive RICHI (antinuclear antibody) (8/8/2019), Rhinitis, Sleep apnea, and Ulnar neuropathy at elbow, right. to contribute to the complexity of this ED evaluation.    Procedures performed: none    Discussed management with physician/appropriate source: Dr. Carmen - agrees with observation and if abdominal pain improves ok with dc home    Considered admission/deescalation of care for: pancreatitis    Social determinants of health affecting patient  care: none    Prescription medications considered: norco, discussed continuing current medication regimen    The patient requires continuous monitoring for: abdominal pain    Shared decision making: discussed possible admission        Disposition and Plan     Clinical Impression:  1. Other acute pancreatitis without infection or necrosis    2. Elevated LFTs        Disposition:  Discharge    Follow-up:  Noemy Carmen MD  [address]  [address]  [facility] [address]  [phone]    Follow up        Medications Prescribed:  Current Discharge Medication List        START taking these medications    Details   HYDROcodone-acetaminophen 5-325 MG Oral Tab Take 1 tablet by mouth every 6 (six) hours as needed.  Qty: 10 tablet, Refills: 0    Associated Diagnoses: Other acute pancreatitis without infection or necrosis

## 2024-06-15 ENCOUNTER — APPOINTMENT (OUTPATIENT)
Dept: GENERAL RADIOLOGY | Age: 59
End: 2024-06-15
Attending: EMERGENCY MEDICINE

## 2024-06-15 ENCOUNTER — HOSPITAL ENCOUNTER (EMERGENCY)
Age: 59
Discharge: HOME | End: 2024-06-15
Attending: EMERGENCY MEDICINE

## 2024-06-15 VITALS
SYSTOLIC BLOOD PRESSURE: 120 MMHG | TEMPERATURE: 98.4 F | DIASTOLIC BLOOD PRESSURE: 65 MMHG | RESPIRATION RATE: 14 BRPM | HEART RATE: 72 BPM | OXYGEN SATURATION: 99 %

## 2024-06-15 DIAGNOSIS — M25.552 BILATERAL HIP PAIN: Primary | ICD-10-CM

## 2024-06-15 DIAGNOSIS — M25.551 BILATERAL HIP PAIN: Primary | ICD-10-CM

## 2024-06-15 PROCEDURE — 96375 TX/PRO/DX INJ NEW DRUG ADDON: CPT

## 2024-06-15 PROCEDURE — 73523 X-RAY EXAM HIPS BI 5/> VIEWS: CPT

## 2024-06-15 PROCEDURE — 99284 EMERGENCY DEPT VISIT MOD MDM: CPT

## 2024-06-15 PROCEDURE — 10002800 HB RX 250 W HCPCS: Performed by: EMERGENCY MEDICINE

## 2024-06-15 PROCEDURE — 96374 THER/PROPH/DIAG INJ IV PUSH: CPT

## 2024-06-15 RX ORDER — ONDANSETRON 2 MG/ML
4 INJECTION INTRAMUSCULAR; INTRAVENOUS ONCE
Status: COMPLETED | OUTPATIENT
Start: 2024-06-15 | End: 2024-06-15

## 2024-06-15 RX ORDER — TRAMADOL HYDROCHLORIDE 50 MG/1
50 TABLET ORAL EVERY 6 HOURS PRN
Qty: 10 TABLET | Refills: 0 | Status: SHIPPED | OUTPATIENT
Start: 2024-06-15

## 2024-06-15 RX ADMIN — MORPHINE SULFATE 4 MG: 4 INJECTION INTRAVENOUS at 17:50

## 2024-06-15 RX ADMIN — ONDANSETRON 4 MG: 2 INJECTION INTRAMUSCULAR; INTRAVENOUS at 17:50

## 2024-06-15 SDOH — SOCIAL STABILITY: SOCIAL INSECURITY: HOW OFTEN DOES ANYONE, INCLUDING FAMILY AND FRIENDS, SCREAM OR CURSE AT YOU?: NEVER

## 2024-06-15 SDOH — SOCIAL STABILITY: SOCIAL INSECURITY: HOW OFTEN DOES ANYONE, INCLUDING FAMILY AND FRIENDS, PHYSICALLY HURT YOU?: NEVER

## 2024-06-15 SDOH — SOCIAL STABILITY: SOCIAL INSECURITY: HOW OFTEN DOES ANYONE, INCLUDING FAMILY AND FRIENDS, INSULT OR TALK DOWN TO YOU?: NEVER

## 2024-06-15 SDOH — SOCIAL STABILITY: SOCIAL INSECURITY: HOW OFTEN DOES ANYONE, INCLUDING FAMILY AND FRIENDS, THREATEN YOU WITH HARM?: NEVER

## 2024-06-15 ASSESSMENT — ENCOUNTER SYMPTOMS
VOMITING: 0
DIARRHEA: 0
LIGHT-HEADEDNESS: 0
NAUSEA: 0
CHILLS: 0
ABDOMINAL PAIN: 0
DIZZINESS: 0
WOUND: 1
SHORTNESS OF BREATH: 0
FEVER: 0

## 2024-06-15 ASSESSMENT — PAIN SCALES - GENERAL
PAINLEVEL_OUTOF10: 8
PAINLEVEL_OUTOF10: 4
PAINLEVEL_OUTOF10: 7

## 2024-06-15 ASSESSMENT — PAIN DESCRIPTION - PAIN TYPE: TYPE: ACUTE PAIN

## 2024-06-16 LAB
RAINBOW EXTRA TUBES HOLD SPECIMEN: NORMAL

## (undated) DEVICE — FORCEP RADIAL JAW 4

## (undated) DEVICE — SYRINGE FLUSH IV PRE-FILL10M

## (undated) DEVICE — SYRINGE 10ML SLIP TIP

## (undated) DEVICE — FILTERLINE NASAL ADULT O2/CO2

## (undated) DEVICE — 1200CC GUARDIAN II: Brand: GUARDIAN

## (undated) DEVICE — ALCOHOL PREP,2 PLY, MEDIUM: Brand: WEBCOL

## (undated) DEVICE — 3M™ RED DOT™ MONITORING ELECTRODE WITH FOAM TAPE AND STICKY GEL, 50/BAG, 20/CASE, 72/PLT 2570: Brand: RED DOT™

## (undated) DEVICE — BITE BLOCK ADULT 60F ELASTIC

## (undated) DEVICE — GLOVE SURG SENSICARE SZ 7

## (undated) DEVICE — ENDOSCOPY PACK - LOWER: Brand: MEDLINE INDUSTRIES, INC.

## (undated) NOTE — LETTER
Rima Conway, [address]  [facility], [facility]       03/16/20        Patient: Lalitha Villalobos   YOB: 1965   Date of Visit: 3/16/2020       Dear  Dr. Tiffany Mcnally MD,      Thank you for referring Lalitha Villalobos